# Patient Record
Sex: MALE | Race: ASIAN | Employment: STUDENT | URBAN - METROPOLITAN AREA
[De-identification: names, ages, dates, MRNs, and addresses within clinical notes are randomized per-mention and may not be internally consistent; named-entity substitution may affect disease eponyms.]

---
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
## 2018-01-12 ENCOUNTER — OFFICE VISIT (OUTPATIENT)
Dept: FAMILY MEDICINE CLINIC | Facility: CLINIC | Age: 18
End: 2018-01-12

## 2018-01-12 DIAGNOSIS — H61.23 CERUMEN DEBRIS ON TYMPANIC MEMBRANE, BILATERAL: Primary | ICD-10-CM

## 2018-01-12 PROCEDURE — 69210 REMOVE IMPACTED EAR WAX UNI: CPT | Performed by: NURSE PRACTITIONER

## 2018-01-12 NOTE — PROGRESS NOTES
CHIEF COMPLAINT:   Patient presents with:  Ear Pain: ear clogged      HPI:   Elmira Dodd is a 21year old male who presents to clinic today with complaints of clogged ears. Patient requesting ear flush.  Patient reports ongoing issue with cerumen build

## 2019-03-31 ENCOUNTER — HOSPITAL (OUTPATIENT)
Dept: OTHER | Age: 19
End: 2019-03-31
Attending: EMERGENCY MEDICINE

## 2020-07-31 ENCOUNTER — HOSPITAL ENCOUNTER (EMERGENCY)
Age: 20
Discharge: HOME | End: 2020-07-31
Attending: EMERGENCY MEDICINE

## 2020-07-31 VITALS
DIASTOLIC BLOOD PRESSURE: 68 MMHG | HEIGHT: 73 IN | HEART RATE: 89 BPM | BODY MASS INDEX: 23.93 KG/M2 | OXYGEN SATURATION: 98 % | RESPIRATION RATE: 16 BRPM | SYSTOLIC BLOOD PRESSURE: 120 MMHG | TEMPERATURE: 97.3 F | WEIGHT: 180.56 LBS

## 2020-07-31 DIAGNOSIS — S01.512A LACERATION OF TONGUE, INITIAL ENCOUNTER: ICD-10-CM

## 2020-07-31 DIAGNOSIS — S60.511A ABRASION OF RIGHT HAND, INITIAL ENCOUNTER: ICD-10-CM
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
## 2020-07-31 PROCEDURE — 99282 EMERGENCY DEPT VISIT SF MDM: CPT

## 2020-07-31 ASSESSMENT — ENCOUNTER SYMPTOMS
SHORTNESS OF BREATH: 0
HEADACHES: 0
FEVER: 0
BACK PAIN: 0
WOUND: 1
ABDOMINAL PAIN: 0

## 2021-07-19 ENCOUNTER — HOSPITAL ENCOUNTER (EMERGENCY)
Age: 21
Discharge: HOME | End: 2021-07-19
Attending: EMERGENCY MEDICINE

## 2021-07-19 ENCOUNTER — APPOINTMENT (OUTPATIENT)
Dept: GENERAL RADIOLOGY | Age: 21
End: 2021-07-19

## 2021-07-19 VITALS
HEIGHT: 71 IN | BODY MASS INDEX: 25.68 KG/M2 | HEART RATE: 70 BPM | OXYGEN SATURATION: 99 % | SYSTOLIC BLOOD PRESSURE: 128 MMHG | TEMPERATURE: 98.3 F | RESPIRATION RATE: 15 BRPM | WEIGHT: 183.42 LBS | DIASTOLIC BLOOD PRESSURE: 78 MMHG

## 2021-07-19 DIAGNOSIS — S93.601A SPRAIN OF RIGHT FOOT, INITIAL ENCOUNTER: Primary | ICD-10-CM

## 2021-07-19 PROCEDURE — 99283 EMERGENCY DEPT VISIT LOW MDM: CPT

## 2021-07-19 PROCEDURE — 73630 X-RAY EXAM OF FOOT: CPT

## 2021-07-19 PROCEDURE — 73650 X-RAY EXAM OF HEEL: CPT

## 2021-07-19 ASSESSMENT — PAIN SCALES - GENERAL: PAINLEVEL_OUTOF10: 2

## 2023-09-25 DIAGNOSIS — R10.9 RIGHT FLANK PAIN: Primary | ICD-10-CM

## (undated) NOTE — ED AVS SNAPSHOT
[facility] Emergency Department    [place] [address]     [address] [place] [address]    Phone:  [phone]    Fax:  [address]   MRN: [id number]    Department:  [facility] Emergency Department   Date of Visit:  1/16/20 Cover your cough and wash your hands frequently to prevent the spread of infection. Do not share drinks or food. Control fever using Tylenol or Motrin every 6 hours.  You can use both Tylenol and Motrin, but alternate them so the patient is getting one ever primary care or specialist physician may see patients referred from the [facility] Emergency Department. Follow-up care is at the discretion of that Physician.   If you need additional assistance selecting a physician, you may call the [facility] Additional Information       We are concerned for your overall well being:    - If you are a smoker or have smoked in the last 12 months, we encourage you to explore options for quitting.     - If you have concerns related to behavioral health issues or th

## (undated) NOTE — ED AVS SNAPSHOT
[facility] Emergency Department    [place] [address]    Phone:  [phone]    Fax:  [address]   MRN: [id number]    Department:  [facility] Emergency Department   Date of Visit:  1/16/20 and Class Registration line at [phone] or find a doctor online by visiting [email or web address].    IF THERE IS ANY CHANGE OR WORSENING OF YOUR CONDITION, CALL YOUR PRIMARY CARE PHYSICIAN AT ONCE OR RETURN IMMEDIATELY TO [address] [place] [address].     If